# Patient Record
Sex: FEMALE | Race: WHITE | NOT HISPANIC OR LATINO
[De-identification: names, ages, dates, MRNs, and addresses within clinical notes are randomized per-mention and may not be internally consistent; named-entity substitution may affect disease eponyms.]

---

## 2019-01-29 ENCOUNTER — ASOB RESULT (OUTPATIENT)
Age: 59
End: 2019-01-29

## 2019-01-29 ENCOUNTER — APPOINTMENT (OUTPATIENT)
Age: 59
End: 2019-01-29
Payer: COMMERCIAL

## 2019-01-29 PROCEDURE — 76857 US EXAM PELVIC LIMITED: CPT

## 2021-01-01 ENCOUNTER — OUTPATIENT (OUTPATIENT)
Dept: OUTPATIENT SERVICES | Facility: HOSPITAL | Age: 61
LOS: 1 days | End: 2021-01-01
Payer: MEDICAID

## 2021-01-01 ENCOUNTER — OUTPATIENT (OUTPATIENT)
Dept: OUTPATIENT SERVICES | Facility: HOSPITAL | Age: 61
LOS: 1 days | End: 2021-01-01

## 2021-01-01 PROCEDURE — G9005: CPT

## 2021-02-01 DIAGNOSIS — Z71.89 OTHER SPECIFIED COUNSELING: ICD-10-CM

## 2021-02-02 DIAGNOSIS — Z71.89 OTHER SPECIFIED COUNSELING: ICD-10-CM

## 2023-05-10 ENCOUNTER — OFFICE (OUTPATIENT)
Dept: URBAN - METROPOLITAN AREA CLINIC 8 | Facility: CLINIC | Age: 63
Setting detail: OPHTHALMOLOGY
End: 2023-05-10
Payer: COMMERCIAL

## 2023-05-10 DIAGNOSIS — E11.9: ICD-10-CM

## 2023-05-10 DIAGNOSIS — H25.13: ICD-10-CM

## 2023-05-10 DIAGNOSIS — H35.373: ICD-10-CM

## 2023-05-10 DIAGNOSIS — H40.013: ICD-10-CM

## 2023-05-10 DIAGNOSIS — H43.813: ICD-10-CM

## 2023-05-10 PROCEDURE — 99214 OFFICE O/P EST MOD 30 MIN: CPT | Performed by: OPHTHALMOLOGY

## 2023-05-10 ASSESSMENT — REFRACTION_MANIFEST
OS_CYLINDER: -1.00
OD_VA2: 20/60(J6)
OD_VA1: 20/150
OS_ADD: +2.50
OS_AXIS: 096
OU_VA: 20/70-
OD_CYLINDER: -0.75
OS_VA1: 20/80
OD_AXIS: 105
OD_ADD: +2.50
OD_SPHERE: -4.00
OS_VA2: 20/60(J6)
OS_SPHERE: -3.25

## 2023-05-10 ASSESSMENT — REFRACTION_CURRENTRX
OS_AXIS: 088
OD_OVR_VA: 20/
OD_VPRISM_DIRECTION: PROGS
OS_ADD: +2.50
OD_SPHERE: -4.00
OD_AXIS: 105
OD_ADD: +2.50
OS_SPHERE: -3.25
OS_CYLINDER: -1.00
OS_OVR_VA: 20/
OS_VPRISM_DIRECTION: PROGS
OD_CYLINDER: -0.75

## 2023-05-10 ASSESSMENT — KERATOMETRY
OS_AXISANGLE_DEGREES: 160
OD_K1POWER_DIOPTERS: 46.00
METHOD_AUTO_MANUAL: AUTO
OS_K2POWER_DIOPTERS: 46.00
OD_K2POWER_DIOPTERS: 46.00
OD_AXISANGLE_DEGREES: 000
OS_K1POWER_DIOPTERS: 45.25

## 2023-05-10 ASSESSMENT — AXIALLENGTH_DERIVED
OD_AL: 24.4053
OS_AL: 24.2926
OS_AL: 25.4276

## 2023-05-10 ASSESSMENT — VISUAL ACUITY
OD_BCVA: 20/80
OS_BCVA: 20/150

## 2023-05-10 ASSESSMENT — CONFRONTATIONAL VISUAL FIELD TEST (CVF)
OD_FINDINGS: FULL
OS_FINDINGS: FULL

## 2023-05-10 ASSESSMENT — SPHEQUIV_DERIVED
OS_SPHEQUIV: -3.75
OS_SPHEQUIV: -6.375
OD_SPHEQUIV: -4.375

## 2023-05-10 ASSESSMENT — REFRACTION_AUTOREFRACTION
OS_CYLINDER: -1.25
OS_AXIS: 082
OS_SPHERE: -5.75
OD_SPHERE: UNABLE

## 2023-06-27 ENCOUNTER — APPOINTMENT (OUTPATIENT)
Dept: CARDIOLOGY | Facility: CLINIC | Age: 63
End: 2023-06-27
Payer: MEDICAID

## 2023-06-27 ENCOUNTER — NON-APPOINTMENT (OUTPATIENT)
Age: 63
End: 2023-06-27

## 2023-06-27 VITALS
DIASTOLIC BLOOD PRESSURE: 78 MMHG | OXYGEN SATURATION: 98 % | WEIGHT: 129 LBS | SYSTOLIC BLOOD PRESSURE: 124 MMHG | HEIGHT: 62 IN | BODY MASS INDEX: 23.74 KG/M2 | HEART RATE: 74 BPM

## 2023-06-27 DIAGNOSIS — Z86.39 PERSONAL HISTORY OF OTHER ENDOCRINE, NUTRITIONAL AND METABOLIC DISEASE: ICD-10-CM

## 2023-06-27 DIAGNOSIS — Z01.818 ENCOUNTER FOR OTHER PREPROCEDURAL EXAMINATION: ICD-10-CM

## 2023-06-27 DIAGNOSIS — R01.1 CARDIAC MURMUR, UNSPECIFIED: ICD-10-CM

## 2023-06-27 DIAGNOSIS — E11.9 TYPE 2 DIABETES MELLITUS W/OUT COMPLICATIONS: ICD-10-CM

## 2023-06-27 DIAGNOSIS — Z00.00 ENCOUNTER FOR GENERAL ADULT MEDICAL EXAMINATION W/OUT ABNORMAL FINDINGS: ICD-10-CM

## 2023-06-27 DIAGNOSIS — F22 DELUSIONAL DISORDERS: ICD-10-CM

## 2023-06-27 PROCEDURE — 93000 ELECTROCARDIOGRAM COMPLETE: CPT

## 2023-06-27 PROCEDURE — 99215 OFFICE O/P EST HI 40 MIN: CPT | Mod: 25

## 2023-06-27 RX ORDER — CHROMIUM 200 MCG
TABLET ORAL
Refills: 0 | Status: ACTIVE | COMMUNITY

## 2023-06-27 RX ORDER — ARIPIPRAZOLE 1 MG/ML
1 SOLUTION ORAL
Refills: 0 | Status: ACTIVE | COMMUNITY

## 2023-06-27 RX ORDER — PIOGLITAZONE HYDROCHLORIDE 30 MG/1
30 TABLET ORAL DAILY
Refills: 0 | Status: ACTIVE | COMMUNITY

## 2023-06-27 RX ORDER — ESCITALOPRAM OXALATE 10 MG/1
10 TABLET, FILM COATED ORAL DAILY
Refills: 0 | Status: ACTIVE | COMMUNITY

## 2023-06-27 RX ORDER — SEMAGLUTIDE 0.68 MG/ML
INJECTION, SOLUTION SUBCUTANEOUS
Refills: 0 | Status: ACTIVE | COMMUNITY

## 2023-06-27 NOTE — DISCUSSION/SUMMARY
[Procedure Low Risk] : the procedure risk is low [Patient Low Risk] : the patient is a low surgical risk [Optimized for Surgery] : the patient is optimized for surgery [As per surgery] : as per surgery [Continue] : Continue medications as currently directed [[email or web address]] : Cleared for cataract surgery, bilateral one week apart

## 2023-06-27 NOTE — REVIEW OF SYSTEMS
[Negative] : Heme/Lymph [de-identified] : [facility]

## 2023-06-27 NOTE — HISTORY OF PRESENT ILLNESS
[Preoperative Visit] : for a medical evaluation prior to surgery [Scheduled Procedure ___] : a [unfilled] [Date of Surgery ___] : on [unfilled] [Good] : Good [Diabetes] : diabetes [Electrocardiogram] : ~T an ECG ~C was performed [Fever] : no fever [Chills] : no chills [Fatigue] : no fatigue [Chest Pain] : no chest pain [Cough] : no cough [Dyspnea] : no dyspnea [Dysuria] : no dysuria [Urinary Frequency] : no urinary frequency [Nausea] : no nausea [Vomiting] : no vomiting [Diarrhea] : no diarrhea [Abdominal Pain] : no abdominal pain [Easy Bruising] : no easy bruising [Lower Extremity Swelling] : no lower extremity swelling [Poor Exercise Tolerance] : no poor exercise tolerance [Cardiovascular Disease] : no cardiovascular disease [Pulmonary Disease] : no pulmonary disease [Anti-Platelet Agents] : no anti-platelet agents [Nicotine Dependence] : no nicotine dependence [Alcohol Use] : no  alcohol use [Renal Disease] : no renal disease [GI Disease] : no gastrointestinal disease [Sleep Apnea] : no sleep apnea [Thromboembolic Problems] : no thromboembolic problems [Frequent use of NSAIDs] : no use of NSAIDs [Transfusion Reaction] : no transfusion reaction [Impaired Immunity] : no impaired immunity [Steroid Use in Last 6 Months] : no steroid use in the last six months [Frequent Aspirin Use] : no frequent aspirin use [Prior Anesthesia] : No prior anesthesia [Prev Anesthesia Reaction] : no previous anesthesia reaction [Anesthesia Reaction] : no anesthesia reaction [Sudden Death] : no sudden death [Clotting Disorder] : no clotting disorder [Bleeding Disorder] : no bleeding disorder

## 2023-06-27 NOTE — PHYSICAL EXAM
[General Appearance - Well Developed] : well developed [Normal Appearance] : normal appearance [Well Groomed] : well groomed [General Appearance - Well Nourished] : well nourished [No Deformities] : no deformities [General Appearance - In No Acute Distress] : no acute distress [Normal Conjunctiva] : the conjunctiva exhibited no abnormalities [Eyelids - No Xanthelasma] : the eyelids demonstrated no xanthelasmas [Normal Oral Mucosa] : normal oral mucosa [No Oral Pallor] : no oral pallor [No Oral Cyanosis] : no oral cyanosis [Normal Jugular Venous A Waves Present] : normal jugular venous A waves present [Normal Jugular Venous V Waves Present] : normal jugular venous V waves present [No Jugular Venous [place] A Waves] : no jugular venous [place] A waves [Respiration, Rhythm And Depth] : normal respiratory rhythm and effort [Exaggerated Use Of Accessory Muscles For Inspiration] : no accessory muscle use [Auscultation Breath Sounds / Voice Sounds] : lungs were clear to auscultation bilaterally [Heart Rate And Rhythm] : heart rate and rhythm were normal [Heart Sounds] : normal S1 and S2 [Murmurs] : no murmurs present [Abdomen Soft] : soft [Abdomen Tenderness] : non-tender [Abdomen Mass (___ Cm)] : no abdominal mass palpated [Abnormal Walk] : normal gait [Gait - Sufficient For Exercise Testing] : the gait was sufficient for exercise testing [Nail Clubbing] : no clubbing of the fingernails [Cyanosis, Localized] : no localized cyanosis [Petechial Hemorrhages (___cm)] : no petechial hemorrhages [Skin Color & Pigmentation] : normal skin color and pigmentation [] : no rash [No Venous Stasis] : no venous stasis [Skin Lesions] : no skin lesions [No Skin Ulcers] : no skin ulcer [No Xanthoma] : no  xanthoma was observed [Oriented To Time, Place, And Person] : oriented to person, place, and time [Affect] : the affect was normal [Mood] : the mood was normal [No Anxiety] : not feeling anxious

## 2023-06-29 ENCOUNTER — OFFICE (OUTPATIENT)
Dept: URBAN - METROPOLITAN AREA CLINIC 38 | Facility: CLINIC | Age: 63
Setting detail: OPHTHALMOLOGY
End: 2023-06-29
Payer: MEDICAID

## 2023-06-29 DIAGNOSIS — H25.13: ICD-10-CM

## 2023-06-29 DIAGNOSIS — H25.11: ICD-10-CM

## 2023-06-29 PROCEDURE — 92136 OPHTHALMIC BIOMETRY: CPT | Performed by: OPHTHALMOLOGY

## 2023-06-29 PROCEDURE — 99213 OFFICE O/P EST LOW 20 MIN: CPT | Performed by: OPHTHALMOLOGY

## 2023-06-29 ASSESSMENT — REFRACTION_CURRENTRX
OS_AXIS: 088
OD_VPRISM_DIRECTION: PROGS
OD_ADD: +2.50
OD_CYLINDER: -0.75
OD_AXIS: 105
OS_ADD: +2.50
OS_OVR_VA: 20/
OS_CYLINDER: -1.00
OD_SPHERE: -4.00
OS_SPHERE: -3.25
OD_OVR_VA: 20/
OS_VPRISM_DIRECTION: PROGS

## 2023-06-29 ASSESSMENT — KERATOMETRY
OS_AXISANGLE_DEGREES: 160
OD_AXISANGLE_DEGREES: 000
METHOD_AUTO_MANUAL: AUTO
OS_K1POWER_DIOPTERS: 45.25
OS_K2POWER_DIOPTERS: 46.00
OD_K1POWER_DIOPTERS: 46.00
OD_K2POWER_DIOPTERS: 46.00

## 2023-06-29 ASSESSMENT — REFRACTION_MANIFEST
OS_VA1: 20/80
OS_ADD: +2.50
OD_ADD: +2.50
OD_CYLINDER: -0.75
OS_VA2: 20/60(J6)
OS_AXIS: 096
OD_VA2: 20/60(J6)
OD_SPHERE: -4.00
OU_VA: 20/70-
OD_VA1: 20/150
OD_AXIS: 105
OS_SPHERE: -3.25
OS_CYLINDER: -1.00

## 2023-06-29 ASSESSMENT — REFRACTION_AUTOREFRACTION
OS_SPHERE: -5.75
OS_CYLINDER: -1.25
OD_SPHERE: UNABLE
OS_AXIS: 082

## 2023-06-29 ASSESSMENT — SPHEQUIV_DERIVED
OD_SPHEQUIV: -4.375
OS_SPHEQUIV: -3.75
OS_SPHEQUIV: -6.375

## 2023-06-29 ASSESSMENT — VISUAL ACUITY
OS_BCVA: 20/150
OD_BCVA: 20/80

## 2023-06-29 ASSESSMENT — AXIALLENGTH_DERIVED
OS_AL: 24.2926
OD_AL: 24.4053
OS_AL: 25.4276

## 2023-06-29 ASSESSMENT — CONFRONTATIONAL VISUAL FIELD TEST (CVF)
OS_FINDINGS: FULL
OD_FINDINGS: FULL

## 2023-07-11 ENCOUNTER — AMBULATORY SURGERY CENTER (OUTPATIENT)
Dept: URBAN - METROPOLITAN AREA SURGERY 4 | Facility: SURGERY | Age: 63
Setting detail: OPHTHALMOLOGY
End: 2023-07-11
Payer: MEDICAID

## 2023-07-11 DIAGNOSIS — H25.11: ICD-10-CM

## 2023-07-11 DIAGNOSIS — H52.211: ICD-10-CM

## 2023-07-11 PROCEDURE — FEMTO FEMTOSECOND LASER: Performed by: OPHTHALMOLOGY

## 2023-07-11 PROCEDURE — 66984 XCAPSL CTRC RMVL W/O ECP: CPT | Performed by: OPHTHALMOLOGY

## 2023-07-12 ENCOUNTER — OFFICE (OUTPATIENT)
Dept: URBAN - METROPOLITAN AREA CLINIC 38 | Facility: CLINIC | Age: 63
Setting detail: OPHTHALMOLOGY
End: 2023-07-12
Payer: MEDICAID

## 2023-07-12 ENCOUNTER — RX ONLY (RX ONLY)
Age: 63
End: 2023-07-12

## 2023-07-12 DIAGNOSIS — Z96.1: ICD-10-CM

## 2023-07-12 PROCEDURE — 99024 POSTOP FOLLOW-UP VISIT: CPT

## 2023-07-12 ASSESSMENT — REFRACTION_MANIFEST
OS_ADD: +2.50
OS_VA2: 20/60(J6)
OU_VA: 20/70-
OD_ADD: +2.50
OS_SPHERE: -3.25
OS_AXIS: 096
OD_VA1: 20/150
OD_VA2: 20/60(J6)
OS_CYLINDER: -1.00
OD_CYLINDER: -0.75
OS_VA1: 20/80
OD_AXIS: 105
OD_SPHERE: -4.00

## 2023-07-12 ASSESSMENT — REFRACTION_CURRENTRX
OD_OVR_VA: 20/
OD_AXIS: 105
OD_VPRISM_DIRECTION: PROGS
OD_SPHERE: -4.00
OS_ADD: +2.50
OD_ADD: +2.50
OS_SPHERE: -3.25
OS_CYLINDER: -1.00
OD_CYLINDER: -0.75
OS_AXIS: 088
OS_VPRISM_DIRECTION: PROGS
OS_OVR_VA: 20/

## 2023-07-12 ASSESSMENT — REFRACTION_AUTOREFRACTION
OD_AXIS: 088
OS_CYLINDER: UNABLE
OS_AXIS: UNABLE
OS_SPHERE: UNABLE
OD_SPHERE: +1.00
OD_CYLINDER: -1.00

## 2023-07-12 ASSESSMENT — VISUAL ACUITY
OD_BCVA: 20/80
OS_BCVA: 20/20

## 2023-07-12 ASSESSMENT — AXIALLENGTH_DERIVED
OD_AL: 22.529
OS_AL: 24.1474
OD_AL: 24.4053

## 2023-07-12 ASSESSMENT — SPHEQUIV_DERIVED
OS_SPHEQUIV: -3.75
OD_SPHEQUIV: 0.5
OD_SPHEQUIV: -4.375

## 2023-07-12 ASSESSMENT — KERATOMETRY
METHOD_AUTO_MANUAL: AUTO
OD_K1POWER_DIOPTERS: 46.00
OD_AXISANGLE_DEGREES: 090
OD_K2POWER_DIOPTERS: 46.00
OS_AXISANGLE_DEGREES: 153
OS_K1POWER_DIOPTERS: 45.75
OS_K2POWER_DIOPTERS: 46.25

## 2023-07-12 ASSESSMENT — TONOMETRY
OS_IOP_MMHG: 16
OD_IOP_MMHG: 20

## 2023-07-12 ASSESSMENT — CORNEAL EDEMA CLINICAL DESCRIPTION: OD_CORNEALEDEMA: T @ INCISION

## 2023-07-12 ASSESSMENT — CONFRONTATIONAL VISUAL FIELD TEST (CVF)
OS_FINDINGS: FULL
OD_FINDINGS: FULL

## 2023-07-13 ENCOUNTER — OFFICE (OUTPATIENT)
Dept: URBAN - METROPOLITAN AREA CLINIC 38 | Facility: CLINIC | Age: 63
Setting detail: OPHTHALMOLOGY
End: 2023-07-13
Payer: MEDICAID

## 2023-07-13 DIAGNOSIS — H25.13: ICD-10-CM

## 2023-07-13 PROCEDURE — 92136 OPHTHALMIC BIOMETRY: CPT | Performed by: OPHTHALMOLOGY

## 2023-07-13 ASSESSMENT — REFRACTION_MANIFEST
OS_SPHERE: -3.25
OU_VA: 20/70-
OS_ADD: +2.50
OS_VA1: 20/80
OD_VA1: 20/20
OD_VA2: 20/60(J6)
OD_ADD: +2.50
OS_CYLINDER: -1.00
OS_VA2: 20/60(J6)
OD_AXIS: 095
OD_SPHERE: +1.00
OD_CYLINDER: -1.00
OS_AXIS: 096

## 2023-07-13 ASSESSMENT — CONFRONTATIONAL VISUAL FIELD TEST (CVF)
OD_FINDINGS: FULL
OS_FINDINGS: FULL

## 2023-07-13 ASSESSMENT — KERATOMETRY
OD_K1POWER_DIOPTERS: 45.25
OS_AXISANGLE_DEGREES: 147
OD_K2POWER_DIOPTERS: 45.75
OS_K1POWER_DIOPTERS: 45.75
OS_K2POWER_DIOPTERS: 46.00
METHOD_AUTO_MANUAL: AUTO
OD_AXISANGLE_DEGREES: 015

## 2023-07-13 ASSESSMENT — SPHEQUIV_DERIVED
OS_SPHEQUIV: -3.75
OD_SPHEQUIV: 0.5
OD_SPHEQUIV: 0.875

## 2023-07-13 ASSESSMENT — AXIALLENGTH_DERIVED
OD_AL: 22.6977
OD_AL: 22.5633
OS_AL: 24.1956

## 2023-07-13 ASSESSMENT — TONOMETRY
OD_IOP_MMHG: 18
OS_IOP_MMHG: 17

## 2023-07-13 ASSESSMENT — REFRACTION_CURRENTRX
OD_SPHERE: -4.00
OD_OVR_VA: 20/
OS_CYLINDER: -1.00
OS_SPHERE: -3.25
OD_ADD: +2.50
OS_AXIS: 088
OD_CYLINDER: -0.75
OS_OVR_VA: 20/
OD_AXIS: 105
OS_VPRISM_DIRECTION: PROGS
OD_VPRISM_DIRECTION: PROGS
OS_ADD: +2.50

## 2023-07-13 ASSESSMENT — VISUAL ACUITY
OS_BCVA: 20/20
OD_BCVA: 20/80

## 2023-07-13 ASSESSMENT — REFRACTION_AUTOREFRACTION
OD_SPHERE: +1.50
OD_CYLINDER: -1.25
OD_AXIS: 095

## 2023-07-13 ASSESSMENT — CORNEAL EDEMA CLINICAL DESCRIPTION: OD_CORNEALEDEMA: T @ INCISION

## 2023-07-25 ENCOUNTER — AMBULATORY SURGERY CENTER (OUTPATIENT)
Dept: URBAN - METROPOLITAN AREA SURGERY 4 | Facility: SURGERY | Age: 63
Setting detail: OPHTHALMOLOGY
End: 2023-07-25
Payer: MEDICAID

## 2023-07-25 DIAGNOSIS — H52.212: ICD-10-CM

## 2023-07-25 DIAGNOSIS — H25.12: ICD-10-CM

## 2023-07-25 PROCEDURE — 66984 XCAPSL CTRC RMVL W/O ECP: CPT | Performed by: OPHTHALMOLOGY

## 2023-07-25 PROCEDURE — V2787 ASTIGMATISM-CORRECT FUNCTION: HCPCS | Performed by: OPHTHALMOLOGY

## 2023-07-25 PROCEDURE — FEMTO FEMTOSECOND LASER: Performed by: OPHTHALMOLOGY

## 2023-07-26 ENCOUNTER — OFFICE (OUTPATIENT)
Dept: URBAN - METROPOLITAN AREA CLINIC 38 | Facility: CLINIC | Age: 63
Setting detail: OPHTHALMOLOGY
End: 2023-07-26
Payer: MEDICAID

## 2023-07-26 DIAGNOSIS — Z96.1: ICD-10-CM

## 2023-07-26 PROCEDURE — 99024 POSTOP FOLLOW-UP VISIT: CPT

## 2023-07-26 ASSESSMENT — KERATOMETRY
OD_AXISANGLE_DEGREES: 090
OD_K1POWER_DIOPTERS: 45.75
METHOD_AUTO_MANUAL: AUTO
OD_K2POWER_DIOPTERS: 45.75
OS_AXISANGLE_DEGREES: 114
OS_K1POWER_DIOPTERS: 45.75
OS_K2POWER_DIOPTERS: 46.00

## 2023-07-26 ASSESSMENT — REFRACTION_MANIFEST
OD_SPHERE: +1.00
OD_VA2: 20/60(J6)
OS_AXIS: 096
OD_ADD: +2.50
OD_VA1: 20/20
OS_VA2: 20/60(J6)
OU_VA: 20/70-
OD_CYLINDER: -1.00
OS_SPHERE: -3.25
OS_VA1: 20/80
OD_AXIS: 095
OS_ADD: +2.50
OS_CYLINDER: -1.00

## 2023-07-26 ASSESSMENT — TONOMETRY
OS_IOP_MMHG: 17
OS_IOP_MMHG: 19
OD_IOP_MMHG: 16

## 2023-07-26 ASSESSMENT — REFRACTION_AUTOREFRACTION
OD_AXIS: 091
OD_SPHERE: +1.00
OD_CYLINDER: -1.00
OS_AXIS: 176
OS_CYLINDER: -0.25
OS_SPHERE: +0.50

## 2023-07-26 ASSESSMENT — AXIALLENGTH_DERIVED
OS_AL: 22.6156
OS_AL: 24.1956
OD_AL: 22.613
OD_AL: 22.613

## 2023-07-26 ASSESSMENT — REFRACTION_CURRENTRX
OS_SPHERE: -3.25
OD_OVR_VA: 20/
OD_ADD: +2.50
OD_AXIS: 105
OD_VPRISM_DIRECTION: PROGS
OS_VPRISM_DIRECTION: PROGS
OD_CYLINDER: -0.75
OD_SPHERE: -4.00
OS_CYLINDER: -1.00
OS_AXIS: 088
OS_OVR_VA: 20/
OS_ADD: +2.50

## 2023-07-26 ASSESSMENT — SPHEQUIV_DERIVED
OS_SPHEQUIV: -3.75
OD_SPHEQUIV: 0.5
OD_SPHEQUIV: 0.5
OS_SPHEQUIV: 0.375

## 2023-07-26 ASSESSMENT — CORNEAL EDEMA CLINICAL DESCRIPTION: OS_CORNEALEDEMA: T @ INCISION

## 2023-07-26 ASSESSMENT — CONFRONTATIONAL VISUAL FIELD TEST (CVF)
OD_FINDINGS: FULL
OS_FINDINGS: FULL

## 2023-07-26 ASSESSMENT — VISUAL ACUITY
OD_BCVA: 20/20-2
OS_BCVA: 20/20

## 2023-08-21 ENCOUNTER — OFFICE (OUTPATIENT)
Dept: URBAN - METROPOLITAN AREA CLINIC 38 | Facility: CLINIC | Age: 63
Setting detail: OPHTHALMOLOGY
End: 2023-08-21
Payer: MEDICAID

## 2023-08-21 DIAGNOSIS — H01.002: ICD-10-CM

## 2023-08-21 DIAGNOSIS — H01.005: ICD-10-CM

## 2023-08-21 DIAGNOSIS — H16.223: ICD-10-CM

## 2023-08-21 DIAGNOSIS — Z96.1: ICD-10-CM

## 2023-08-21 PROCEDURE — 99024 POSTOP FOLLOW-UP VISIT: CPT

## 2023-08-21 ASSESSMENT — REFRACTION_CURRENTRX
OD_OVR_VA: 20/
OD_ADD: +2.50
OS_VPRISM_DIRECTION: SV
OS_OVR_VA: 20/
OD_VPRISM_DIRECTION: SV
OS_ADD: +2.50

## 2023-08-21 ASSESSMENT — REFRACTION_MANIFEST
OS_SPHERE: -0.25
OS_CYLINDER: SPHERE
OS_ADD: +2.50
OS_VA2: 20/20(J1+)
OD_CYLINDER: -1.00
OD_SPHERE: PLANO
OU_VA: 20/20
OD_VA2: 20/20(J1+)
OS_VA1: 20/20-2
OD_VA1: 20/20
OD_ADD: +2.50

## 2023-08-21 ASSESSMENT — SPHEQUIV_DERIVED
OD_SPHEQUIV: 0.75
OS_SPHEQUIV: 0.625

## 2023-08-21 ASSESSMENT — REFRACTION_AUTOREFRACTION
OS_AXIS: 062
OS_SPHERE: +0.75
OD_CYLINDER: -1.50
OS_CYLINDER: -0.25
OD_AXIS: 092
OD_SPHERE: +1.50

## 2023-08-21 ASSESSMENT — KERATOMETRY
OD_K1POWER_DIOPTERS: 45.25
OS_AXISANGLE_DEGREES: 090
OD_AXISANGLE_DEGREES: 174
OS_K2POWER_DIOPTERS: 46.00
OD_K2POWER_DIOPTERS: 46.00
METHOD_AUTO_MANUAL: AUTO
OS_K1POWER_DIOPTERS: 46.00

## 2023-08-21 ASSESSMENT — AXIALLENGTH_DERIVED
OS_AL: 22.4847
OD_AL: 22.5658

## 2023-08-21 ASSESSMENT — VISUAL ACUITY
OD_BCVA: 20/25-1
OS_BCVA: 20/20

## 2023-08-21 ASSESSMENT — LID EXAM ASSESSMENTS
OD_BLEPHARITIS: 1+ 2+
OS_BLEPHARITIS: 1+ 2+

## 2023-08-21 ASSESSMENT — TONOMETRY
OD_IOP_MMHG: 18
OS_IOP_MMHG: 16

## 2023-08-21 ASSESSMENT — TEAR BREAK UP TIME (TBUT)
OD_TBUT: 2S
OS_TBUT: 2S

## 2023-08-21 ASSESSMENT — CONFRONTATIONAL VISUAL FIELD TEST (CVF)
OD_FINDINGS: FULL
OS_FINDINGS: FULL

## 2023-11-20 ENCOUNTER — OFFICE (OUTPATIENT)
Dept: URBAN - METROPOLITAN AREA CLINIC 38 | Facility: CLINIC | Age: 63
Setting detail: OPHTHALMOLOGY
End: 2023-11-20
Payer: COMMERCIAL

## 2023-11-20 DIAGNOSIS — H01.002: ICD-10-CM

## 2023-11-20 DIAGNOSIS — H16.223: ICD-10-CM

## 2023-11-20 DIAGNOSIS — Z96.1: ICD-10-CM

## 2023-11-20 DIAGNOSIS — H01.005: ICD-10-CM

## 2023-11-20 PROCEDURE — 99213 OFFICE O/P EST LOW 20 MIN: CPT

## 2023-11-20 ASSESSMENT — REFRACTION_CURRENTRX
OD_ADD: +2.50
OS_OVR_VA: 20/
OD_VPRISM_DIRECTION: SV
OD_OVR_VA: 20/
OS_VPRISM_DIRECTION: SV
OS_ADD: +2.50

## 2023-11-20 ASSESSMENT — SUPERFICIAL PUNCTATE KERATITIS (SPK)
OD_SPK: 1+
OS_SPK: 1+

## 2023-11-20 ASSESSMENT — REFRACTION_MANIFEST
OS_VA2: 20/20(J1+)
OD_VA1: 20/20
OD_VA2: 20/20(J1+)
OD_ADD: +2.50
OD_SPHERE: PLANO
OU_VA: 20/20
OS_ADD: +2.50
OS_CYLINDER: SPHERE
OD_CYLINDER: -1.00
OS_VA1: 20/20-2
OS_SPHERE: -0.25

## 2023-11-20 ASSESSMENT — REFRACTION_AUTOREFRACTION
OD_AXIS: 088
OS_CYLINDER: -0.75
OS_SPHERE: +0.50
OD_CYLINDER: -1.25
OD_SPHERE: +1.50
OS_AXIS: 147

## 2023-11-20 ASSESSMENT — SPHEQUIV_DERIVED
OD_SPHEQUIV: 0.875
OS_SPHEQUIV: 0.125

## 2023-11-20 ASSESSMENT — LID EXAM ASSESSMENTS
OS_BLEPHARITIS: 1+ 2+
OD_BLEPHARITIS: 1+ 2+

## 2023-11-20 ASSESSMENT — CONFRONTATIONAL VISUAL FIELD TEST (CVF)
OD_FINDINGS: FULL
OS_FINDINGS: FULL

## 2023-11-20 ASSESSMENT — TEAR BREAK UP TIME (TBUT)
OD_TBUT: 2 SECONDS
OS_TBUT: 2 SECONDS

## 2024-05-20 ENCOUNTER — OFFICE (OUTPATIENT)
Dept: URBAN - METROPOLITAN AREA CLINIC 38 | Facility: CLINIC | Age: 64
Setting detail: OPHTHALMOLOGY
End: 2024-05-20
Payer: COMMERCIAL

## 2024-05-20 DIAGNOSIS — H01.002: ICD-10-CM

## 2024-05-20 DIAGNOSIS — H35.373: ICD-10-CM

## 2024-05-20 DIAGNOSIS — H40.013: ICD-10-CM

## 2024-05-20 DIAGNOSIS — H01.005: ICD-10-CM

## 2024-05-20 PROCEDURE — 92134 CPTRZ OPH DX IMG PST SGM RTA: CPT

## 2024-05-20 PROCEDURE — 92014 COMPRE OPH EXAM EST PT 1/>: CPT

## 2024-05-20 ASSESSMENT — LID EXAM ASSESSMENTS
OS_BLEPHARITIS: 1+ 2+
OD_BLEPHARITIS: 1+ 2+

## 2024-05-20 ASSESSMENT — CONFRONTATIONAL VISUAL FIELD TEST (CVF)
OS_FINDINGS: FULL
OD_FINDINGS: FULL

## 2025-07-22 ENCOUNTER — NON-APPOINTMENT (OUTPATIENT)
Age: 65
End: 2025-07-22